# Patient Record
Sex: FEMALE | Race: WHITE | NOT HISPANIC OR LATINO | Employment: FULL TIME | URBAN - METROPOLITAN AREA
[De-identification: names, ages, dates, MRNs, and addresses within clinical notes are randomized per-mention and may not be internally consistent; named-entity substitution may affect disease eponyms.]

---

## 2017-04-17 ENCOUNTER — ALLSCRIPTS OFFICE VISIT (OUTPATIENT)
Dept: OTHER | Facility: OTHER | Age: 62
End: 2017-04-17

## 2017-04-17 DIAGNOSIS — E78.5 HYPERLIPIDEMIA: ICD-10-CM

## 2017-10-09 ENCOUNTER — ALLSCRIPTS OFFICE VISIT (OUTPATIENT)
Dept: OTHER | Facility: OTHER | Age: 62
End: 2017-10-09

## 2017-10-09 ENCOUNTER — GENERIC CONVERSION - ENCOUNTER (OUTPATIENT)
Dept: OTHER | Facility: OTHER | Age: 62
End: 2017-10-09

## 2017-10-09 ENCOUNTER — APPOINTMENT (OUTPATIENT)
Dept: LAB | Facility: CLINIC | Age: 62
End: 2017-10-09
Payer: COMMERCIAL

## 2017-10-09 DIAGNOSIS — E78.5 HYPERLIPIDEMIA: ICD-10-CM

## 2017-10-09 LAB
CHOLEST SERPL-MCNC: 142 MG/DL (ref 50–200)
HDLC SERPL-MCNC: 72 MG/DL (ref 40–60)
LDLC SERPL CALC-MCNC: 48 MG/DL (ref 0–100)
TRIGL SERPL-MCNC: 111 MG/DL
TSH SERPL DL<=0.05 MIU/L-ACNC: 0.8 UIU/ML (ref 0.36–3.74)

## 2017-10-09 PROCEDURE — 36415 COLL VENOUS BLD VENIPUNCTURE: CPT

## 2017-10-09 PROCEDURE — 84443 ASSAY THYROID STIM HORMONE: CPT

## 2017-10-09 PROCEDURE — 80061 LIPID PANEL: CPT

## 2017-10-10 NOTE — PROGRESS NOTES
Assessment  1  Hyperlipidemia (272 4) (E78 5)   2  Anxiety disorder (300 00) (F41 9)   3  Hypokalemia (276 8) (E87 6)   4  History of breast cancer (V10 3) (Z85 3)    Plan  Hyperlipidemia    · (1) CBC/PLT/DIFF; Status:Active; Requested ZNB:[id number];    · (1) COMPREHENSIVE METABOLIC PANEL; Status:Active; Requested FCV:[id number];    · (1) LIPID PANEL, FASTING; Status:Active; Requested OOD:01MTU9202;    · (1) LIPID PANEL, FASTING; Status:Active; Requested XRO:25NJQ3743;    · (1) TSH; Status:Active; Requested for:65Okn5275;    · (1) TSH; Status:Active; Requested CLL:10QOW4405;   Need for influenza vaccination    · Fluzone Quadrivalent Intramuscular Suspension    Discussion/Summary  Discussion Summary:   Hyperlipidemia continue Crestor, follow-up lipid profile  continue to take potassium  of breast cancer follows up with oncology regularly  doing well after 2 years of her 's death  Counseling Documentation With Imm: The patient was counseled regarding diagnostic results,-instructions for management,-risk factor reductions,-risks and benefits of treatment options,-importance of compliance with treatment  Medication SE Review and Pt Understands Tx: Possible side effects of new medications were reviewed with the patient/guardian today  The treatment plan was reviewed with the patient/guardian  The patient/guardian understands and agrees with the treatment plan      Chief Complaint  Chief Complaint Chronic Condition [facility]: Patient is here today for follow up of chronic conditions described in HPI  History of Present Illness  Anxiety Disorder (Follow-Up): The patient is being seen for follow-up of generalized anxiety disorder  The patient reports no change in the condition  She has no comorbid illnesses  She has had no significant interval events  The patient is currently asymptomatic  The patient is not currently on medication for this problem  Electrolyte Imbalance (Brief):  The patient is being seen for a routine clinic follow-up of electrolyte imbalance  The patient has hypokalemia  The patient is currently asymptomatic  Current treatment includes oral potassium replacement  Hyperlipidemia (Follow-Up): The patient states her hyperlipidemia has been stable since the last visit  She has no comorbid illnesses  She has no significant interval events  Symptoms: The patient is currently asymptomatic  Medications: the patient is adherent with her medication regimen -She denies medication side effects  Review of Systems  Complete-Female:   Constitutional: No fever, no chills, feels well, no tiredness, no recent weight gain or weight loss  Eyes: No complaints of eye pain, no red eyes, no eyesight problems, no discharge, no dry eyes, no itching of eyes  ENT: no complaints of earache, no loss of hearing, no nose bleeds, no nasal discharge, no sore throat, no hoarseness  Cardiovascular: No complaints of slow heart rate, no fast heart rate, no chest pain, no palpitations, no leg claudication, no lower extremity edema  Respiratory: No complaints of shortness of breath, no wheezing, no cough, no SOB on exertion, no orthopnea, no PND  Gastrointestinal: No complaints of abdominal pain, no constipation, no nausea or vomiting, no diarrhea, no bloody stools  Genitourinary: No complaints of dysuria, no incontinence, no pelvic pain, no dysmenorrhea, no vaginal discharge or bleeding  Musculoskeletal: No complaints of arthralgias, no myalgias, no joint swelling or stiffness, no limb pain or swelling  Integumentary: No complaints of skin rash or lesions, no itching, no skin wounds, no breast pain or lump  Neurological: No complaints of headache, no confusion, no convulsions, no numbness, no dizziness or fainting, no tingling, no limb weakness, no difficulty walking  Psychiatric: Not suicidal, no sleep disturbance, no anxiety or depression, no change in personality, no emotional problems     Endocrine: No complaints of proptosis, no hot flashes, no muscle weakness, no deepening of the voice, no feelings of weakness  Hematologic/Lymphatic: No complaints of swollen glands, no swollen glands in the neck, does not bleed easily, does not bruise easily  Active Problems  1  Anxiety disorder (300 00) (F41 9)   2  History of breast cancer (V10 3) (Z85 3)   3  History of edema (V13 89) (Z87 898)   4  Hyperlipidemia (272 4) (E78 5)   5  Hypokalemia (276 8) (E87 6)   6  Multiple benign melanocytic nevi (216 9) (D22 9)   7  Need for influenza vaccination (V04 81) (Z23)   8  History of Recent bereavement (V49 89) (Z63 4)   9  Screening for skin condition (V82 0) (Z13 89)    Past Medical History  1  History of Abdominal pain of multiple sites (789 09) (R10 9)   2  History of Acute maxillary sinusitis (461 0) (J01 00)   3  History of Alopecia (704 00) (L65 9)   4  History of Breast cancer (174 9) (C50 919)   5  History of Colon cancer screening (V76 51) (Z12 11)   6  History of edema (V13 89) (Z87 898)   7  History of insomnia (V13 89) (Z87 898)   8  History of obesity (V13 89) (Z86 39)   9  History of shortness of breath (V13 89) (Z87 898)   10  History of sinusitis (V12 69) (Z87 09)   11  History of urinary tract infection (V13 02) (Z87 440)   12  Hyperlipidemia (272 4) (E78 5)   13  History of Malaise and fatigue (780 79) (B48 48,F80 75)  Active Problems And Past Medical History Reviewed: The active problems and past medical history were reviewed and updated today  Surgical History  1  History of Ankle Surgery   2  History of Breast Surgery Mastectomy   3  History of Breast Surgery Reconstruction   4  History of Hysterectomy  Surgical History Reviewed: The surgical history was reviewed and updated today  Family History  Father    1  Family history of coronary artery disease (V17 3) (Z82 49)   2  Family history of Hypertension (401 9) (I10)  Other    3   No significant family history  Family History Reviewed: The family history was reviewed and updated today  Social History   · Alcohol use   · Never a smoker   · History of Recent bereavement (V43 89) (Z63 4)  Social History Reviewed: The social history was reviewed and updated today  The social history was reviewed and is unchanged  Current Meds   1  Anastrozole 1 MG Oral Tablet; TAKE 1 TABLET DAILY  Requested for: 36UWM1462; Last   Rx:02Mar2016 Ordered   2  Klor-Con 10 10 MEQ Oral Tablet Extended Release; Take 1 tablet daily; Therapy: 43Pbd6436 to (Evaluate:84Cki4329)  Requested for: 25GFQ0351; Last Rx:09Oct2017   Ordered   3  Rosuvastatin Calcium 10 MG Oral Tablet; Take 1 tablet daily; Therapy: 96Gzy1633 to (Evaluate:21Wog6439)  Requested for: 29YBW1131; Last Rx:09Oct2017   Ordered   4  Torsemide 10 MG Oral Tablet; Take 1 tablet daily; Therapy: 50Znm0462 to (Evaluate:98Rmd7987)  Requested for: 96UAJ0981; Last Rx:09Oct2017   Ordered  Medication List Reviewed: The medication list was reviewed and updated today  Allergies  1  Biaxin TABS   2  Sulfa Drugs    Vitals  Vital Signs    Recorded: 49DMD6320 02:55PM   Heart Rate 82   Systolic 839   Diastolic 80   Height 5 ft 6 in   Weight 162 lb 6 oz   BMI Calculated 26 21   BSA Calculated 1 83     Physical Exam    Constitutional   General appearance: Abnormal   appears tired  Head and Face   Head and face: Normal     Palpation of the face and sinuses: No sinus tenderness  Eyes   Conjunctiva and lids: No swelling, erythema or discharge  Pupils and irises: Equal, round, reactive to light  Ears, Nose, Mouth, and Throat   External inspection of ears and nose: Normal     Otoscopic examination: Tympanic membranes translucent with normal light reflex  Canals patent without erythema  Oropharynx: Normal with no erythema, edema, exudate or lesions  Neck   Neck: Supple, symmetric, trachea midline, no masses  Thyroid: Normal, no thyromegaly      Pulmonary   Respiratory effort: No increased work of breathing or signs of respiratory distress  Auscultation of lungs: Clear to auscultation  Cardiovascular   Palpation of heart: Normal PMI, no thrills  Auscultation of heart: Normal rate and rhythm, normal S1 and S2, no murmurs  Examination of extremities for edema and/or varicosities: Normal     Abdomen   Abdomen: Non-tender, no masses  Lymphatic   Palpation of lymph nodes in neck: No lymphadenopathy  Musculoskeletal   Gait and station: Normal     Skin   Skin and subcutaneous tissue: Normal without rashes or lesions  Neurologic   Cranial nerves: Cranial nerves II-XII intact  Cortical function: Normal mental status  Reflexes: 2+ and symmetric  Sensation: No sensory loss  Psychiatric   Judgment and insight: Normal     Mood and affect: Normal        Health Management  History of Colon cancer screening   COLONOSCOPY; every 5 years; Last 11XTJ6875; Next Due: 23Yro3248; Active    Future Appointments    Date/Time Provider Specialty Site   04/04/2018 11:00 AM LORENA Menon   Internal Medicine [address] [facility]     Signatures   Electronically signed by : LORENA Jackman ; Oct  9 2017  5:41PM EST                       (Author)

## 2017-12-26 ENCOUNTER — GENERIC CONVERSION - ENCOUNTER (OUTPATIENT)
Dept: INTERNAL MEDICINE CLINIC | Facility: CLINIC | Age: 62
End: 2017-12-26

## 2018-01-12 VITALS
WEIGHT: 170.5 LBS | HEART RATE: 88 BPM | HEIGHT: 66 IN | SYSTOLIC BLOOD PRESSURE: 112 MMHG | DIASTOLIC BLOOD PRESSURE: 78 MMHG | BODY MASS INDEX: 27.4 KG/M2

## 2018-01-12 VITALS
DIASTOLIC BLOOD PRESSURE: 78 MMHG | OXYGEN SATURATION: 98 % | SYSTOLIC BLOOD PRESSURE: 108 MMHG | BODY MASS INDEX: 27.36 KG/M2 | WEIGHT: 170.25 LBS | HEART RATE: 100 BPM | HEIGHT: 66 IN | TEMPERATURE: 97.4 F

## 2018-01-13 NOTE — PROGRESS NOTES
Assessment  Assessed    1  Hyperlipidemia (272 4) (E78 5)   2  Anxiety disorder (300 00) (F41 9)   3  Edema (782 3) (R60 9)    Discussion/Summary  Cardiology Discussion Summary Free Text Note Form [facility]:   Patient overall doing reasonably well from cardiac standpoint  Continue present medications  Dietary and risk factor modification to continue  She recently saw her primary care physician  Dr Elly Montana  Symptoms to watch out from cardiac standpoint  Discussed with patient  Emotional support provided to the patient  Followup in 6 months  Chief Complaint  Chief Complaint Chronic Condition [facility]: Patient is here today for follow up of chronic conditions described in HPI  History of Present Illness  Cardiology HPI Free Text Note Form [facility]: Patient denies any chest pain shortness of breath  No history of leg edema orthopnea PND  No history presyncope syncope  She lost her  a few months ago  She seems to be coping with it  Reasonably well at this time  Review of Systems  Cardiology Female ROS:     Cardiac: as noted in HPI  Skin: No complaints of nonhealing sores or skin rash  Genitourinary: No complaints of recurrent urinary tract infections, frequent urination at night, difficult urination, blood in urine, kidney stones, loss of bladder control, kidney problems, denies any birth control or hormone replacement, is not post menopausal, not currently pregnant  Psychological: anxiety  General: No complaints of trouble sleeping, lack of energy, fatigue, appetite changes, weight changes, fever, frequent infections, or night sweats  HEENT: No complaints of serious problems, hearing problems, nose problems, throat problems, or snoring  Gastrointestinal: No complaints of liver problems, nausea, vomiting, heartburn, constipation, bloody stools, diarrhea, problems swallowing, adbominal pain, or rectal bleeding     Neurological: No complaints of numbness, tingling, dizziness, weakness, seizures, headaches, syncope or fainting, AM fatigue, daytime sleepiness, no witnessed apnea episodes  Musculoskeletal: No complaints of arthritis, back pain, or painfull swelling  ROS Reviewed:   ROS reviewed  Active Problems  Problems    1  Anxiety disorder (300 00) (F41 9)   2  Edema (782 3) (R60 9)   3  History of breast cancer (V10 3) (Z85 3)   4  Hyperlipidemia (272 4) (E78 5)   5  Hypokalemia (276 8) (E87 6)   6  Insomnia (780 52) (G47 00)   7  Need for influenza vaccination (V04 81) (Z23)   8  Obesity (278 00) (E66 9)   9  Recent bereavement (V49 89) (Z63 4)    Past Medical History  Problems    1  History of Alopecia (704 00) (L65 9)   2  History of Breast cancer (174 9) (C50 919)   3  Edema (782 3) (R60 9)   4  History of shortness of breath (V13 89) (Z87 898)   5  Hyperlipidemia (272 4) (E78 5)   6  History of Malaise and fatigue (780 79) (G27 15,Z23 52)  Active Problems And Past Medical History Reviewed: The active problems and past medical history were reviewed and updated today  Surgical History  Problems    1  History of Ankle Surgery   2  History of Breast Surgery Mastectomy   3  History of Breast Surgery Reconstruction   4  History of Hysterectomy  Surgical History Reviewed: The surgical history was reviewed and updated today  Family History  Father    1  Family history of coronary artery disease (V17 3) (Z82 49)   2  Family history of Hypertension (401 9) (I10)  Other    3  No significant family history  Family History Reviewed: The family history was reviewed and updated today  Social History  Problems    · Alcohol use   · Never a smoker   · Recent bereavement (V49 89) (Z63 4)  Social History Reviewed: The social history was reviewed and updated today  Current Meds   1  Arimidex 1 MG Oral Tablet Recorded   2  Crestor 10 MG Oral Tablet; TAKE 1 TABLET DAILY  Requested for: 10Aug2015; Last   Rx:10Aug2015 Ordered   3   Klor-Con 10 10 MEQ Oral Tablet Extended Release; TAKE 1 TABLET DAILY  Requested   for: 10Aug2015; Last Rx:10Aug2015 Ordered   4  Torsemide 10 MG Oral Tablet; TAKE 1 TABLET DAILY  Requested for: 10Aug2015; Last   Rx:10Aug2015 Ordered  Medication List Reviewed: The medication list was reviewed and updated today  Allergies  Medication    1  Biaxin TABS   2  Sulfa Drugs    Vitals  Vital Signs [Data Includes: Current Encounter]    Recorded: 80ADG2261 01:48PM   Heart Rate 82   Systolic 358   Diastolic 78   Height 5 ft 6 in   Weight 175 lb 2 08 oz   BMI Calculated 28 27   BSA Calculated 1 9     Physical Exam    Constitutional   General appearance: No acute distress, well appearing and well nourished  Eyes   Conjunctiva and Sclera examination: Conjunctiva pink, sclera anicteric  Ears, Nose, Mouth, and Throat - Oropharynx: Clear, nares are clear, mucous membranes are moist    Neck   Neck and thyroid: Normal, supple, trachea midline, no thyromegaly  Pulmonary   Auscultation of lungs: Clear to auscultation, no rales, no rhonchi, no wheezing, good air movement  Cardiovascular   Auscultation of heart: Normal rate and rhythm, normal S1 and S2, no murmurs  Carotid pulses: Normal, 2+ bilaterally  Peripheral vascular exam: Normal pulses throughout, no tenderness, erythema or swelling  Pedal pulses: Normal, 2+ bilaterally  Examination of extremities for edema and/or varicosities: Abnormal   Trace edema  Abdomen   Abdomen: Non-tender and no distention  Liver and spleen: No hepatomegaly or splenomegaly  Musculoskeletal Gait and station: Normal gait  Digits and nails: Normal without clubbing or cyanosis  Inspection/palpation of joints, bones, and muscles: Normal, ROM normal     Skin - Skin and subcutaneous tissue: Normal without rashes or lesions  Skin is warm and well perfused, normal turgor  Neurologic - Cranial nerves: II - XII intact   Speech: Normal     Psychiatric - Orientation to person, place, and time: Normal  Mood and affect: Normal  Future Appointments    Date/Time Provider Specialty Site   07/18/2016 10:45 AM LORENA Horton  Cardiology [facility] CARDIOLOGY [facility]   03/02/2016 05:30 PM LORENA Bond   Internal Medicine [facility]  2  CT     Signatures   Electronically signed by : LORENA Mehta ; Jan 18 2016  9:02PM EST                       (Author)

## 2018-01-14 VITALS
BODY MASS INDEX: 26.09 KG/M2 | HEIGHT: 66 IN | HEART RATE: 82 BPM | WEIGHT: 162.38 LBS | SYSTOLIC BLOOD PRESSURE: 110 MMHG | DIASTOLIC BLOOD PRESSURE: 80 MMHG

## 2018-01-22 VITALS
BODY MASS INDEX: 26.03 KG/M2 | DIASTOLIC BLOOD PRESSURE: 72 MMHG | OXYGEN SATURATION: 95 % | HEART RATE: 84 BPM | SYSTOLIC BLOOD PRESSURE: 110 MMHG | HEIGHT: 66 IN | WEIGHT: 162 LBS

## 2018-04-09 DIAGNOSIS — E78.5 HYPERLIPIDEMIA: ICD-10-CM
